# Patient Record
Sex: MALE | Race: WHITE | NOT HISPANIC OR LATINO | URBAN - METROPOLITAN AREA
[De-identification: names, ages, dates, MRNs, and addresses within clinical notes are randomized per-mention and may not be internally consistent; named-entity substitution may affect disease eponyms.]

---
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
## 2021-02-19 ENCOUNTER — IMPORTED ENCOUNTER (OUTPATIENT)
Dept: URBAN - METROPOLITAN AREA CLINIC 31 | Facility: CLINIC | Age: 21
End: 2021-02-19

## 2021-02-19 PROBLEM — H18.603: Noted: 2021-02-19

## 2021-02-19 PROCEDURE — 92014 COMPRE OPH EXAM EST PT 1/>: CPT

## 2021-02-19 PROCEDURE — 92025 CPTRIZED CORNEAL TOPOGRAPHY: CPT

## 2021-05-13 NOTE — PATIENT DISCUSSION
PRIOR TEMPORAL ARTERITIS PER PATIENT - PATIENT TOOK ORAL STEROIDS FOR A FEW MONTHS BUT SHE STOPPED TAKING THEM PER PATIENT. PATIENT FIRST SAW DR. Kezia Chatterjee AND THEN ANOTHER EYE DOCTOR WHO SAID TO STOP ORAL STEROIDS IN ORDER TO GET TA BIOPSY DONE. BIOPSY WAS NEVER SCHEDULED. 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 I HAVE NO RECORDS OF VISION OR [facility] LAB WORKUP, SO CAN'T VERIFY IF VISION IS THE SAME AS IT WAS PRIOR TO [facility]. WILL  GET [facility]INA RECORDS, AND Sean Adorno MD ASAP (NEW RETINA WANTED AT PATIENT'S REQUEST). AT THIS POINT, TA BIOPSY WOULDN'T BE WARRANTED SINCE PAIN IS GONE, AND SHE IS MONTHS OUT OF TAKING STEROIDS.

## 2021-05-13 NOTE — PATIENT DISCUSSION
AMD (DRY), OD  PRESCRIBE AREDS 2 VITAMINS / AMSLER GRID QD/ UV PROTECTION. SMOKING CESSATION EMPHASIZED. RETURN FOR FOLLOW-UP AS SCHEDULED.

## 2021-06-18 ENCOUNTER — IMPORTED ENCOUNTER (OUTPATIENT)
Dept: URBAN - METROPOLITAN AREA CLINIC 31 | Facility: CLINIC | Age: 21
End: 2021-06-18

## 2021-06-18 PROCEDURE — 99213 OFFICE O/P EST LOW 20 MIN: CPT

## 2021-06-18 PROCEDURE — 65210 REMOVE FOREIGN BODY FROM EYE: CPT

## 2021-09-10 ENCOUNTER — IMPORTED ENCOUNTER (OUTPATIENT)
Dept: URBAN - METROPOLITAN AREA CLINIC 31 | Facility: CLINIC | Age: 21
End: 2021-09-10

## 2021-09-10 PROCEDURE — 92015 DETERMINE REFRACTIVE STATE: CPT

## 2021-09-10 NOTE — PATIENT DISCUSSION
Refractive error - Update rx. Discussed aneiso and adaptation with possible need for CL correction. To call if any problems.

## 2022-04-02 ASSESSMENT — VISUAL ACUITY
OS_PH: CC 20/40 +2
OS_SC: 20/40-1
OS_CC: 20/200
OD_SC: 20/25-2
OS_CC: 20/80
OD_SC: 20/30
OD_CC: 20/30
OS_CC: 20/80
OD_SC: 20/30
OS_SC: 20/30
OS_PH: CC 20/30 -2
OS_SC: 20/50
OD_SC: 20/25
OS_PH: CC 20/25 -2
OD_CC: 20/30
OD_CC: 20/40
OS_CC: 20/100
OS_PH: SC 20/40
OD_PH: SC 20/30
OS_CC: 20/80
OD_CC: 20/25-1
OD_SC: 20/25-2
OS_CC: 20/100
OS_SC: 20/30
OS_CC: 20/80+1
OD_CC: 20/30-2
OS_CC: 20/80-2
OD_SC: 20/25
OS_SC: 20/40
OS_PH: CC 20/30
OD_SC: 20/30
OD_SC: 20/30+3
OD_CC: 20/40
OS_PH: SC 20/50
OS_SC: 20/40+2
OS_SC: 20/40
OS_PH: SC 20/70
OD_SC: 20/25-2
OS_SC: 20/30-2
OS_PH: CC 20/30
OD_CC: 20/80
OS_SC: 20/50
OS_PH: CC 20/30 +2
OD_CC: 20/40
OD_PH: 20/40
OS_PH: SC 20/40 +2
OD_SC: 20/25-2
OD_SC: 20/30+1
OD_SC: 20/20
OS_PH: SC 20/50 -1
OS_SC: 20/40
OS_PH: SC 20/50 -2
OD_CC: 20/40+2
OS_SC: 20/50
OS_CC: 20/400
OS_CC: 20/80-1
OD_CC: 20/30-1
OS_CC: 20/100
OS_SC: 20/40-1

## 2022-04-02 ASSESSMENT — TONOMETRY
OD_IOP_MMHG: 15
OS_IOP_MMHG: 19
OS_IOP_MMHG: 22
OD_IOP_MMHG: 13
OS_IOP_MMHG: 15
OD_IOP_MMHG: 16

## 2023-01-11 ENCOUNTER — EMERGENCY VISIT (OUTPATIENT)
Dept: URBAN - METROPOLITAN AREA CLINIC 27 | Facility: CLINIC | Age: 23
End: 2023-01-11

## 2023-01-11 DIAGNOSIS — H10.33: ICD-10-CM

## 2023-01-11 PROCEDURE — 99213 OFFICE O/P EST LOW 20 MIN: CPT

## 2023-01-11 RX ORDER — TOBRAMYCIN AND DEXAMETHASONE 1; 3 MG/ML; MG/ML: 1 SUSPENSION/ DROPS OPHTHALMIC

## 2023-01-11 ASSESSMENT — VISUAL ACUITY
OD_CC: 20/20-1
OS_PH: 20/25-1
OS_CC: 20/40

## 2023-01-11 ASSESSMENT — TONOMETRY
OD_IOP_MMHG: 16
OS_IOP_MMHG: 16

## 2023-01-11 NOTE — PATIENT DISCUSSION
Start on Tobradex drops QID OU.  Contagion risk and importance of hand washing discussed. RTC 1 week, sooner if s/s worsen or does not improve.

## 2023-01-18 ENCOUNTER — FOLLOW UP (OUTPATIENT)
Dept: URBAN - METROPOLITAN AREA CLINIC 27 | Facility: CLINIC | Age: 23
End: 2023-01-18

## 2023-01-18 DIAGNOSIS — H10.33: ICD-10-CM

## 2023-01-18 PROCEDURE — 99212 OFFICE O/P EST SF 10 MIN: CPT

## 2023-01-18 ASSESSMENT — VISUAL ACUITY
OS_CC: 20/25-2
OD_CC: 20/20-2

## 2023-01-18 ASSESSMENT — TONOMETRY
OD_IOP_MMHG: 11
OS_IOP_MMHG: 12

## 2023-09-14 ENCOUNTER — TELEPHONE ENCOUNTER (OUTPATIENT)
Dept: URBAN - METROPOLITAN AREA CLINIC 63 | Facility: CLINIC | Age: 23
End: 2023-09-14

## 2023-09-22 ENCOUNTER — EMERGENCY VISIT (OUTPATIENT)
Dept: URBAN - METROPOLITAN AREA CLINIC 29 | Facility: CLINIC | Age: 23
End: 2023-09-22

## 2023-09-22 DIAGNOSIS — H18.613: ICD-10-CM

## 2023-09-22 DIAGNOSIS — H02.816: ICD-10-CM

## 2023-09-22 PROCEDURE — 65205 REMOVE FOREIGN BODY FROM EYE: CPT

## 2023-09-22 PROCEDURE — 92012 INTRM OPH EXAM EST PATIENT: CPT

## 2023-09-22 ASSESSMENT — VISUAL ACUITY
OS_CC: 20/50
OD_CC: 20/25+2

## 2023-10-31 ENCOUNTER — TELEPHONE ENCOUNTER (OUTPATIENT)
Dept: URBAN - METROPOLITAN AREA CLINIC 64 | Facility: CLINIC | Age: 23
End: 2023-10-31

## 2023-11-02 ENCOUNTER — OFFICE VISIT (OUTPATIENT)
Dept: URBAN - METROPOLITAN AREA CLINIC 63 | Facility: CLINIC | Age: 23
End: 2023-11-02

## 2023-11-28 ENCOUNTER — LAB OUTSIDE AN ENCOUNTER (OUTPATIENT)
Dept: URBAN - METROPOLITAN AREA CLINIC 63 | Facility: CLINIC | Age: 23
End: 2023-11-28

## 2023-11-28 ENCOUNTER — OFFICE VISIT (OUTPATIENT)
Dept: URBAN - METROPOLITAN AREA CLINIC 63 | Facility: CLINIC | Age: 23
End: 2023-11-28
Payer: COMMERCIAL

## 2023-11-28 VITALS
OXYGEN SATURATION: 98 % | HEIGHT: 75 IN | HEART RATE: 78 BPM | BODY MASS INDEX: 28.97 KG/M2 | WEIGHT: 233 LBS | TEMPERATURE: 98.1 F | SYSTOLIC BLOOD PRESSURE: 136 MMHG | DIASTOLIC BLOOD PRESSURE: 84 MMHG

## 2023-11-28 DIAGNOSIS — K62.5 RECTAL BLEEDING: ICD-10-CM

## 2023-11-28 DIAGNOSIS — K59.09 CHRONIC CONSTIPATION: ICD-10-CM

## 2023-11-28 PROCEDURE — 99204 OFFICE O/P NEW MOD 45 MIN: CPT | Performed by: INTERNAL MEDICINE

## 2023-11-28 RX ORDER — FLECAINIDE ACETATE 100 MG/1
TAKE 1 TABLET TWICE A DAY TABLET ORAL
Status: ACTIVE | COMMUNITY

## 2023-11-28 NOTE — HPI-TODAY'S VISIT:
Patient comes for rectal bleeding ( red blood) for the last  2 months. denies family h/o colon cancer or polyps. Patient with occasional constipation will start trial with fiber, water and Miralax. Will plan Flex sigmoidoscopy and will follow closely. Patient denies any medical problem. The rectal exam was difered to the day of the procedure.

## 2023-12-13 ENCOUNTER — TELEPHONE ENCOUNTER (OUTPATIENT)
Dept: URBAN - METROPOLITAN AREA CLINIC 64 | Facility: CLINIC | Age: 23
End: 2023-12-13

## 2024-01-11 ENCOUNTER — OUT OF OFFICE VISIT (OUTPATIENT)
Dept: URBAN - METROPOLITAN AREA SURGERY CENTER 4 | Facility: SURGERY CENTER | Age: 24
End: 2024-01-11
Payer: COMMERCIAL

## 2024-01-11 DIAGNOSIS — K92.1 HEMATOCHEZIA: ICD-10-CM

## 2024-01-11 DIAGNOSIS — K64.8 INTERNAL HEMORRHOIDS: ICD-10-CM

## 2024-01-11 DIAGNOSIS — K64.1 INTERNAL HEMORRHOIDS GRADE II: ICD-10-CM

## 2024-01-11 PROCEDURE — 00811 ANES LWR INTST NDSC NOS: CPT | Performed by: NURSE ANESTHETIST, CERTIFIED REGISTERED

## 2024-01-11 PROCEDURE — 45330 DIAGNOSTIC SIGMOIDOSCOPY: CPT | Performed by: INTERNAL MEDICINE

## 2024-01-11 RX ORDER — HYDROCORTISONE 25 MG/G
1 APPLICATION CREAM TOPICAL TWICE A DAY
Qty: 1 | Refills: 0 | OUTPATIENT
Start: 2024-01-11 | End: 2024-01-21

## 2024-01-11 RX ORDER — FLECAINIDE ACETATE 100 MG/1
TAKE 1 TABLET TWICE A DAY TABLET ORAL
Status: ACTIVE | COMMUNITY

## 2024-01-24 ENCOUNTER — DASHBOARD ENCOUNTERS (OUTPATIENT)
Age: 24
End: 2024-01-24

## 2024-01-29 ENCOUNTER — OFFICE VISIT (OUTPATIENT)
Dept: URBAN - METROPOLITAN AREA CLINIC 60 | Facility: CLINIC | Age: 24
End: 2024-01-29

## 2024-01-30 ENCOUNTER — OFFICE VISIT (OUTPATIENT)
Dept: URBAN - METROPOLITAN AREA CLINIC 60 | Facility: CLINIC | Age: 24
End: 2024-01-30

## 2024-01-30 RX ORDER — FLECAINIDE ACETATE 100 MG/1
TAKE 1 TABLET TWICE A DAY TABLET ORAL
Status: ACTIVE | COMMUNITY

## 2024-04-05 ENCOUNTER — EMERGENCY VISIT (OUTPATIENT)
Dept: URBAN - METROPOLITAN AREA CLINIC 29 | Facility: CLINIC | Age: 24
End: 2024-04-05

## 2024-04-05 DIAGNOSIS — H02.816: ICD-10-CM

## 2024-04-05 PROCEDURE — 99214 OFFICE O/P EST MOD 30 MIN: CPT

## 2024-04-05 RX ORDER — NEOMYCIN SULFATE, POLYMYXIN B SULFATE AND DEXAMETHASONE 3.5; 10000; 1 MG/ML; [USP'U]/ML; MG/ML
1 SUSPENSION OPHTHALMIC
Start: 2024-04-05

## 2024-04-05 ASSESSMENT — VISUAL ACUITY
OS_CC: 20/40-1
OD_CC: 20/30+2